# Patient Record
Sex: FEMALE | Race: OTHER | NOT HISPANIC OR LATINO | URBAN - METROPOLITAN AREA
[De-identification: names, ages, dates, MRNs, and addresses within clinical notes are randomized per-mention and may not be internally consistent; named-entity substitution may affect disease eponyms.]

---

## 2017-07-16 ENCOUNTER — EMERGENCY (EMERGENCY)
Facility: HOSPITAL | Age: 32
LOS: 1 days | Discharge: HOME | End: 2017-07-16
Admitting: EMERGENCY MEDICINE
Payer: MEDICAID

## 2017-07-16 VITALS
DIASTOLIC BLOOD PRESSURE: 97 MMHG | OXYGEN SATURATION: 100 % | HEART RATE: 71 BPM | RESPIRATION RATE: 16 BRPM | SYSTOLIC BLOOD PRESSURE: 141 MMHG

## 2017-07-16 PROCEDURE — 99285 EMERGENCY DEPT VISIT HI MDM: CPT

## 2017-07-16 PROCEDURE — 90792 PSYCH DIAG EVAL W/MED SRVCS: CPT
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
## 2017-07-16 NOTE — ED BEHAVIORAL HEALTH ASSESSMENT NOTE - RISK ASSESSMENT
acute risk is now mitigated by patient having metabolized alcohol and drugs  no elevated acute risk  chronic elevated risk given severe substance dependence and antisocial personality patholgy The patient is at a chronically elevated risk given severe substance dependence and antisocial personality pathology, homelessness, currently prostituting, The patient is at a chronically elevated risk given severe substance dependence and antisocial personality pathology, homelessness, sex work. The patient is at a chronically elevated risk given severe substance dependence, homelessness, and sex work. The patient's most acute risk factor is her arrest and possible intermediate time for assault and she is likely citing suicidality in an attempt to avoid this. Her risk factors are mitigated by her lack of suicide attempts in the past.  The patient's symptoms on presentation were likely due to acute intoxication and now that she has metabolized, she does not warrant psychiatric admission at this time. She is stable for outpatient treatment of her substance use disorder and to go into custody of [facility].
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
## 2017-07-16 NOTE — ED PROVIDER NOTE - PROGRESS NOTE DETAILS
Haile JAUREGUI: I received sign out on this patient.  Tox screen pos etoh 50s.  Pt evaluated and cleared for discharge by psych.  To be dc'd in police custody.
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
## 2017-07-16 NOTE — ED BEHAVIORAL HEALTH ASSESSMENT NOTE - SAFETY PLAN DETAILS
return to ED in the event of an emergency Safety planning done with patient and family. Advised to secure all sharps and medication bottles out of patient's reach at home. They deny having any firearms at home. They were advised to call 911 or take the patient to the nearest ER if patient's behavior worsened or if there are any safety concerns.
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
## 2017-07-16 NOTE — ED BEHAVIORAL HEALTH ASSESSMENT NOTE - DESCRIPTION
appears intoxicated. denies 10th grade drop out. grew up in [place]. not close to her family per her report. had been living with mom prior to [facility] but they had a falling out. mom does not have a phone per pt. appears intoxicated but is verbally redirected.
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
## 2017-07-17 VITALS
TEMPERATURE: 98 F | DIASTOLIC BLOOD PRESSURE: 89 MMHG | RESPIRATION RATE: 16 BRPM | OXYGEN SATURATION: 99 % | HEART RATE: 69 BPM | SYSTOLIC BLOOD PRESSURE: 132 MMHG

## 2017-07-17 LAB
APAP SERPL-MCNC: < 15 UG/ML — LOW (ref 15–25)
BARBITURATES MEASUREMENT: NEGATIVE — SIGNIFICANT CHANGE UP
BENZODIAZ SERPL-MCNC: NEGATIVE — SIGNIFICANT CHANGE UP
ETHANOL BLD-MCNC: 54 MG/DL — HIGH
SALICYLATES SERPL-MCNC: < 5 MG/DL — LOW (ref 15–30)

## 2017-07-17 RX ORDER — IBUPROFEN 200 MG
400 TABLET ORAL ONCE
Qty: 0 | Refills: 0 | Status: COMPLETED | OUTPATIENT
Start: 2017-07-17 | End: 2017-07-17

## 2017-07-17 RX ORDER — NICOTINE POLACRILEX 2 MG
1 GUM BUCCAL DAILY
Qty: 0 | Refills: 0 | Status: DISCONTINUED | OUTPATIENT
Start: 2017-07-17 | End: 2017-07-20

## 2017-07-17 RX ADMIN — Medication 400 MILLIGRAM(S): at 02:09

## 2017-07-17 RX ADMIN — Medication 1 PATCH: at 01:37

## 2018-04-08 ENCOUNTER — EMERGENCY (EMERGENCY)
Facility: HOSPITAL | Age: 33
LOS: 1 days | Discharge: HOME | End: 2018-04-08
Admitting: EMERGENCY MEDICINE
Payer: MEDICAID

## 2018-04-08 VITALS
TEMPERATURE: 98 F | HEART RATE: 87 BPM | OXYGEN SATURATION: 98 % | SYSTOLIC BLOOD PRESSURE: 107 MMHG | DIASTOLIC BLOOD PRESSURE: 85 MMHG | RESPIRATION RATE: 18 BRPM

## 2018-04-08 PROCEDURE — 99283 EMERGENCY DEPT VISIT LOW MDM: CPT
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
## 2018-04-08 NOTE — ED PROVIDER NOTE - MEDICAL DECISION MAKING DETAILS
31 y/o F hx Depression, polysubstance Abuse  Medical evaluation performed. There is no clinical evidence of intoxication or any acute medical problem requiring immediate intervention.  [facility] with [facility]
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
## 2018-05-01 ENCOUNTER — OUTPATIENT (OUTPATIENT)
Dept: OUTPATIENT SERVICES | Facility: HOSPITAL | Age: 33
LOS: 1 days | End: 2018-05-01
Payer: MEDICAID

## 2018-05-01 PROCEDURE — G9001: CPT

## 2018-05-05 DIAGNOSIS — R69 ILLNESS, UNSPECIFIED: ICD-10-CM
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
## 2022-12-20 NOTE — ED ADULT NURSE NOTE - PRO INTERPRETER NEED 2
Assessment and Recommendations:  25y female w/ pmhx/ochx of asthma presenting with inferior visual field cut of 1 day duration. Examined by outpatient general ophthalmologist and retina specialist with no abnormal retinal findings. Sent to ED for further eval.    # Inferior hemifield defect right eye  - differential includes atypical optic neuritis vs. compressive optic neuropathy vs. ischemic optic neuropathy  - normal retinal exam with no disc edema - confirmed on outpatient OCT RNFL. Inferior hemifield defect confirmed on outpatient HVF testing.   - CTH and CTA unremarkable  - agree with MR brain and orbits w/w/o con  - agree with atypical optic neuritis workup - NMO, MOG, ACE, ESR, syphilis screen, lyme screen  - steroid treatment per neurology  - Ophthalmology to follow    Pt seen and discussed with Dr. Sepulveda. Discussed with Dr. Harris, neuro-ophthalmology attending.     Outpatient follow-up: Patient should follow-up with his/her ophthalmologist or with [facility] Department of Ophthalmology at the address below     [address] [place] [address]. [address]  [place] [address]  [phone] 25 year-old woman with a PMH of asthma brought in by family to the ED on 12/14/22 with c/o acute vision loss OD of 2 days duration described as initial blurry vision in the inferior half of the visual field in the right eye, with progression to blurry vision in the entire right eye and darkening of the inferior half of the visual field in the right eye, for which Neurology was consulted. Neuro exam stable with blurred vision on superior quadrants on R eye and R APD. Plan for PLEX given no significant improvement. BB contacted to initiate PLEX. A course of 5 PLEX over 10 days planned. Plan of care discussed with the patient and neurology team.     We will initiate PLEX today, 1 plasma volume with 5% albumin as replacement fluid.    Therapeutic plasma exchange (PLEX) procedure (including replacement with 5% albumin and/or plasma and anticoagulation), benefits, risks and complications of the procedure (electrolyte imbalance, fluid imbalance, transfusion transmitted infections and transfusion reactions which may be life threatening) and alternative options explained in detail to the patient who verbalized understanding and consented to the procedure after all questions answered.       English